# Patient Record
Sex: MALE | Race: WHITE | URBAN - METROPOLITAN AREA
[De-identification: names, ages, dates, MRNs, and addresses within clinical notes are randomized per-mention and may not be internally consistent; named-entity substitution may affect disease eponyms.]

---

## 2017-02-13 ENCOUNTER — OFFICE VISIT (OUTPATIENT)
Dept: DERMATOLOGY | Age: 47
End: 2017-02-13

## 2017-02-13 DIAGNOSIS — T14.8XXA EXCORIATION: ICD-10-CM

## 2017-02-13 DIAGNOSIS — L71.9 ROSACEA: ICD-10-CM

## 2017-02-13 DIAGNOSIS — D22.9 BENIGN NEVUS: ICD-10-CM

## 2017-02-13 DIAGNOSIS — B07.8 OTHER VIRAL WARTS: ICD-10-CM

## 2017-02-13 DIAGNOSIS — L57.0 AK (ACTINIC KERATOSIS): Primary | ICD-10-CM

## 2017-02-13 PROCEDURE — 99214 OFFICE O/P EST MOD 30 MIN: CPT | Performed by: DERMATOLOGY

## 2017-02-13 PROCEDURE — G8421 BMI NOT CALCULATED: HCPCS | Performed by: DERMATOLOGY

## 2017-02-13 PROCEDURE — 17000 DESTRUCT PREMALG LESION: CPT | Performed by: DERMATOLOGY

## 2017-02-13 PROCEDURE — G8427 DOCREV CUR MEDS BY ELIG CLIN: HCPCS | Performed by: DERMATOLOGY

## 2017-02-13 PROCEDURE — 4004F PT TOBACCO SCREEN RCVD TLK: CPT | Performed by: DERMATOLOGY

## 2017-02-13 PROCEDURE — G8484 FLU IMMUNIZE NO ADMIN: HCPCS | Performed by: DERMATOLOGY

## 2018-02-14 ENCOUNTER — OFFICE VISIT (OUTPATIENT)
Dept: DERMATOLOGY | Age: 48
End: 2018-02-14

## 2018-02-14 DIAGNOSIS — L71.9 ROSACEA: ICD-10-CM

## 2018-02-14 DIAGNOSIS — L57.0 AK (ACTINIC KERATOSIS): Primary | ICD-10-CM

## 2018-02-14 DIAGNOSIS — D22.9 BENIGN NEVUS: ICD-10-CM

## 2018-02-14 DIAGNOSIS — L57.8 DIFFUSE PHOTODAMAGE OF SKIN: ICD-10-CM

## 2018-02-14 PROCEDURE — G8421 BMI NOT CALCULATED: HCPCS | Performed by: DERMATOLOGY

## 2018-02-14 PROCEDURE — G8427 DOCREV CUR MEDS BY ELIG CLIN: HCPCS | Performed by: DERMATOLOGY

## 2018-02-14 PROCEDURE — 17000 DESTRUCT PREMALG LESION: CPT | Performed by: DERMATOLOGY

## 2018-02-14 PROCEDURE — 99214 OFFICE O/P EST MOD 30 MIN: CPT | Performed by: DERMATOLOGY

## 2018-02-14 PROCEDURE — G8484 FLU IMMUNIZE NO ADMIN: HCPCS | Performed by: DERMATOLOGY

## 2018-02-14 PROCEDURE — 4004F PT TOBACCO SCREEN RCVD TLK: CPT | Performed by: DERMATOLOGY

## 2018-02-14 NOTE — PROGRESS NOTES
[facility]) Dermatology  Blaine Kyle M.D.  [phone]       Jace Carnes  1970    52 y.o. male     Date of Visit: 2/14/2018    Chief Complaint:   Chief Complaint   Patient presents with    Skin Exam     TBSE        I was asked to see this patient by Dr. Philip ref. provider found. History of Present Illness:  1. Total body skin exam    Multiple nevi. Stable in size, shape, color. Asymptomatic. Patient has not noticed any new or changing pigmented lesions. Progressive erythema and telangiectasias of his nose-father with rhinophyma. No inflammatory papules. Not previously treated. Photodamage-progressive freckling and lentigines of sun exposed areas. Does wear hats and sunscreen. Skin history:  No personal history of dysplastic nevus or skin cancer  + ak     Family history nonmelanoma skin cancer in his father. No family history of melanoma    Review of Systems:  Constitutional: Reports general sense of well-being       Past Medical History, Surgical History, Family History, Medications and Allergies reviewed. Social History:   Social History     Social History    Marital status:      Spouse name: N/A    Number of children: N/A    Years of education: N/A     Occupational History    Not on file. Social History Main Topics    Smoking status: Never Smoker    Smokeless tobacco: Current User      Comment: can a week    Alcohol use Yes    Drug use: Unknown    Sexual activity: Not on file     Other Topics Concern    Not on file     Social History Narrative    No narrative on file       Physical Examination       -General: Well-appearing, NAD  Normal affect. Total body skin exam including scalp, face, neck, chest, abdomen, back, bilateral upper extremities, bilateral lower extremities, ocular conjunctiva, external lips, and nails was performed. Underwear area not examined.   Scattered on the trunk and extremities are multiple well-defined round and oval symmetric smoothly-bordered uniformly brown macules and papules. Background erythema with telangiectasias of his nose. Diffuse back on photo damage with freckling and lentigines. Left temple 4 mm gritty erythematous papule-actinic keratosis      Assessment and Plan     1. AK (actinic keratosis) - Left temple-1 lesion(s) treated w/ liquid nitrogen. Edu re: risk of blister formation, discomfort, scar, hypopigmentation. Discussed wound care. 2. Rosacea -Discussed laser, he can consider in the future if he wishes    3. Benign nevus - Benign acquired melanocytic nevi  -Recommend monthly self skin exams   -Educated regarding the ABCDEs of melanoma detection   -Call for any new/changing moles or concerning lesions  -Reviewed sun protective behavior -- sun avoidance during the peak hours of the day, sun-protective clothing (including hat and sunglasses), sunscreen use (water resistant, broad spectrum, SPF at least 30, need for reapplication every 2 to 3 hours), avoidance of tanning beds      4.  Diffuse photodamage of skin -Hats, sunscreen, sun avoidance

## 2019-05-29 ENCOUNTER — OFFICE VISIT (OUTPATIENT)
Dept: DERMATOLOGY | Age: 49
End: 2019-05-29
Payer: COMMERCIAL

## 2019-05-29 DIAGNOSIS — L57.0 AK (ACTINIC KERATOSIS): Primary | ICD-10-CM

## 2019-05-29 DIAGNOSIS — L71.9 ROSACEA: ICD-10-CM

## 2019-05-29 DIAGNOSIS — D22.9 BENIGN NEVUS: ICD-10-CM

## 2019-05-29 PROCEDURE — G8427 DOCREV CUR MEDS BY ELIG CLIN: HCPCS | Performed by: DERMATOLOGY

## 2019-05-29 PROCEDURE — 17000 DESTRUCT PREMALG LESION: CPT | Performed by: DERMATOLOGY

## 2019-05-29 PROCEDURE — 99213 OFFICE O/P EST LOW 20 MIN: CPT | Performed by: DERMATOLOGY

## 2019-05-29 PROCEDURE — 17003 DESTRUCT PREMALG LES 2-14: CPT | Performed by: DERMATOLOGY

## 2019-05-29 PROCEDURE — 4004F PT TOBACCO SCREEN RCVD TLK: CPT | Performed by: DERMATOLOGY

## 2019-05-29 PROCEDURE — G8421 BMI NOT CALCULATED: HCPCS | Performed by: DERMATOLOGY

## 2019-05-29 NOTE — PROGRESS NOTES
[facility]) Dermatology  Jack Jeronimo M.D.  [phone]       Radha Foley  1970    50 y.o. male     Date of Visit: 5/29/2019    Chief Complaint:   Chief Complaint   Patient presents with    Skin Lesion        I was asked to see this patient by Dr. Philip ref. provider found. History of Present Illness:  1. Total body skin exam    Multiple nevi-stable in size, shape, color. Has not noticed any new or changing pigmented lesions. Does wear hats and sunscreen. Does monitor his skin for change      Skin history:  No personal history of dysplastic nevus or skin cancer  + ak     Family history nonmelanoma skin cancer in his father. No family history of melanoma      Review of Systems:  Constitutional: Reports general sense of well-being       Past Medical History, Surgical History, Family History, Medications and Allergies reviewed. Social History:   Social History     Socioeconomic History    Marital status:      Spouse name: Not on file    Number of children: Not on file    Years of education: Not on file    Highest education level: Not on file   Occupational History    Not on file   Social Needs    Financial resource strain: Not on file    Food insecurity:     Worry: Not on file     Inability: Not on file    Transportation needs:     Medical: Not on file     Non-medical: Not on file   Tobacco Use    Smoking status: Never Smoker    Smokeless tobacco: Current User    Tobacco comment: can a week   Substance and Sexual Activity    Alcohol use:  Yes    Drug use: Not on file    Sexual activity: Not on file   Lifestyle    Physical activity:     Days per week: Not on file     Minutes per session: Not on file    Stress: Not on file   Relationships    Social connections:     Talks on phone: Not on file     Gets together: Not on file     Attends Taoism service: Not on file     Active member of club or organization: Not on file     Attends meetings of clubs or organizations: Not on file Relationship status: Not on file    Intimate partner violence:     Fear of current or ex partner: Not on file     Emotionally abused: Not on file     Physically abused: Not on file     Forced sexual activity: Not on file   Other Topics Concern    Not on file   Social History Narrative    Not on file       Physical Examination       -General: Well-appearing, NAD  1. Normal affect. Total body skin exam including scalp, face, neck, chest, abdomen, back, bilateral upper extremities, bilateral lower extremities, ocular conjunctiva, external lips, and nails was performed. Examination normal unless stated below. Underwear area not examined. Scattered on the trunk and extremities are multiple well-defined round and oval symmetric smoothly-bordered uniformly brown macules and papules. Background erythema of face with no active inflammatory papules. Gritty erythematous papule left anterior shoulder and right lateral forehead at hairline. Assessment and Plan     1. AK (actinic keratosis) - two-face and left shoulder lesion(s) treated w/ liquid nitrogen. Edu re: risk of blister formation, discomfort, scar, hypopigmentation. Discussed wound care. 2. Benign nevus - Benign acquired melanocytic nevi  -Recommend monthly self skin exams   -Educated regarding the ABCDEs of melanoma detection   -Call for any new/changing moles or concerning lesions  -Reviewed sun protective behavior -- sun avoidance during the peak hours of the day, sun-protective clothing (including hat and sunglasses), sunscreen use (water resistant, broad spectrum, SPF at least 30, need for reapplication every 2 to 3 hours), avoidance of tanning beds      3.  Rosacea -no treatment for now

## 2022-05-11 ENCOUNTER — OFFICE VISIT (OUTPATIENT)
Dept: DERMATOLOGY | Age: 52
End: 2022-05-11
Payer: COMMERCIAL

## 2022-05-11 VITALS — TEMPERATURE: 97.2 F

## 2022-05-11 DIAGNOSIS — L71.9 ROSACEA: Primary | ICD-10-CM

## 2022-05-11 DIAGNOSIS — L82.1 SEBORRHEIC KERATOSES: ICD-10-CM

## 2022-05-11 DIAGNOSIS — D22.9 BENIGN NEVUS: ICD-10-CM

## 2022-05-11 PROCEDURE — G8421 BMI NOT CALCULATED: HCPCS | Performed by: DERMATOLOGY

## 2022-05-11 PROCEDURE — 3017F COLORECTAL CA SCREEN DOC REV: CPT | Performed by: DERMATOLOGY

## 2022-05-11 PROCEDURE — 4004F PT TOBACCO SCREEN RCVD TLK: CPT | Performed by: DERMATOLOGY

## 2022-05-11 PROCEDURE — G8427 DOCREV CUR MEDS BY ELIG CLIN: HCPCS | Performed by: DERMATOLOGY

## 2022-05-11 PROCEDURE — 99213 OFFICE O/P EST LOW 20 MIN: CPT | Performed by: DERMATOLOGY

## 2022-05-11 RX ORDER — OMEPRAZOLE 10 MG/1
10 CAPSULE, DELAYED RELEASE ORAL DAILY
COMMUNITY

## 2022-05-11 NOTE — PROGRESS NOTES
[facility]) Dermatology  Kam Delacruz M.D.  [phone]       Jennifer Mathis  1970    46 y.o. male     Date of Visit: 5/11/2022    Chief Complaint:   Chief Complaint   Patient presents with    Skin Lesion     FSE       HX:AK        I was asked to see this patient by Dr. Philip ref. provider found. History of Present Illness:  1. Total-body skin exam    Multiple nevi-stable in size, shape, color. Has not noticed any new or changing pigmented lesions. Increasing number of seborrheic keratoses torso-increasing in size and number. Not itching, bleeding. Asymptomatic    Rosacea-background erythema with telangiectasias nose and cheeks. Does not develop inflammatory papules. Father with a history of severe rosacea/rhinophyma. Skin history:  No personal history of dysplastic nevus or skin cancer  + ak     Family history nonmelanoma skin cancer in his father. No family history of melanoma       Review of Systems:  Constitutional: Reports general sense of well-being       Past Medical History, Surgical History, Family History, Medications and Allergies reviewed. Social History:   Social History     Socioeconomic History    Marital status:      Spouse name: Not on file    Number of children: Not on file    Years of education: Not on file    Highest education level: Not on file   Occupational History    Not on file   Tobacco Use    Smoking status: Never Smoker    Smokeless tobacco: Current User    Tobacco comment: can a week   Vaping Use    Vaping Use: Never used   Substance and Sexual Activity    Alcohol use:  Yes    Drug use: Not on file    Sexual activity: Not on file   Other Topics Concern    Not on file   Social History Narrative    Not on file     Social Determinants of Health     Financial Resource Strain:     Difficulty of Paying Living Expenses: Not on file   Food Insecurity:     Worried About [address] in the Last Year: Not on file    [address] in the Last Year: Not on file   Transportation Needs:     Lack of Transportation (Medical): Not on file    Lack of Transportation (Non-Medical): Not on file   Physical Activity:     Days of Exercise per Week: Not on file    Minutes of Exercise per Session: Not on file   Stress:     Feeling of Stress : Not on file   Social Connections:     Frequency of Communication with Friends and Family: Not on file    Frequency of Social Gatherings with Friends and Family: Not on file    Attends Jewish Services: Not on file    Active Member of [address] [place] [address] [employer] or Organizations: Not on file    Attends Club or Organization Meetings: Not on file    Marital Status: Not on file   Intimate Partner Violence:     Fear of Current or Ex-Partner: Not on file    Emotionally Abused: Not on file    Physically Abused: Not on file    Sexually Abused: Not on file   Housing Stability:     Unable to Pay for Housing in the Last Year: Not on file    Number of [place] in the Last Year: Not on file    Unstable Housing in the Last Year: Not on file       Physical Examination       -General: Well-appearing, NAD  1. Normal affect. Total body skin exam including scalp, face, neck, chest, abdomen, back, bilateral upper extremities, bilateral lower extremities, ocular conjunctiva, external lips, and nails was performed. Examination normal unless stated below. Underwear area not examined. Scattered on the trunk and extremities are multiple well-defined round and oval symmetric smoothly-bordered uniformly brown macules and papules. Background erythema with telangiectasias nose and cheeks. No active inflammatory papules. Scattered hyperkeratotic stuck on papules torso      Assessment and Plan     1. Rosacea-no treatment for now   2.  Benign nevus - Benign acquired melanocytic nevi  -Recommend monthly self skin exams   -Educated regarding the ABCDEs of melanoma detection   -Call for any new/changing moles or concerning lesions  -Reviewed sun protective behavior -- sun avoidance during the peak hours of the day, sun-protective clothing (including hat and sunglasses), sunscreen use (water resistant, broad spectrum, SPF at least 30, need for reapplication every 2 to 3 hours), avoidance of tanning beds      3. Seborrheic keratoses - Discussed underlying nature of seborrheic keratosis and low risk of malignancy. Treatment reserved for lesions that are itching, bleeding, growing or otherwise becoming bothersome. Discussed monitoring for change with reevaluation for changing lesions.

## 2022-11-09 NOTE — H&P
[address] [facility] [address], [address], [address]  Phone: [phone] [facility] ,  [address] [facility]  Phone: [phone]   ZVO:[phone]    CHIEF COMPLAINT     Colon cancer screening       HPI     Thank you Gelacio Tapia MD for asking me to see Divine Leon in consultation. Divine Leon is a 46 y.o. male  [2] White (non-) [1] with medical history of anal fissure status post repair seen independently with referral for colon cancer screening. Denies abdominal pain, nausea, vomiting, fever, chills, unintentional weight loss, constipation, diarrhea, hematochezia or melenic stools. No family history of colon cancer or large colon polyps. Last EGD: None  Last Colonoscopy: None    Review of available records reveals: Wt Readings from Last 50 Encounters:   09/04/12 212 lb 8 oz (96.4 kg)       No components found for: HGBA1C  BP Readings from Last 3 Encounters:   09/04/12 108/72     Health Maintenance   Topic Date Due    COVID-19 Vaccine (1) Never done    Depression Screen  Never done    HIV screen  Never done    Hepatitis C screen  Never done    Lipids  Never done    Colorectal Cancer Screen  Never done    DTaP/Tdap/Td vaccine (2 - Td or Tdap) 08/04/2020    Shingles vaccine (1 of 2) Never done    Flu vaccine (1) Never done    Hepatitis A vaccine  Aged Out    Hib vaccine  Aged Out    Meningococcal (ACWY) vaccine  Aged Out    Pneumococcal 0-64 years Vaccine  Aged Out       No components found for: [facility]   No past medical history on file.   FAMILY HISTORY     Family History   Problem Relation Age of Onset    Arrhythmia Father     Cancer Father         bcc/scc     SOCIAL HISTORY     Social History     Socioeconomic History    Marital status:      Spouse name: Not on file    Number of children: Not on file    Years of education: Not on file    Highest education level: Not on file   Occupational History Not on file   Tobacco Use    Smoking status: Never    Smokeless tobacco: Current    Tobacco comments:     can a week   Vaping Use    Vaping Use: Never used   Substance and Sexual Activity    Alcohol use: Yes    Drug use: Not on file    Sexual activity: Not on file   Other Topics Concern    Not on file   Social History Narrative    Not on file     Social Determinants of Health     Financial Resource Strain: Not on file   Food Insecurity: Not on file   Transportation Needs: Not on file   Physical Activity: Not on file   Stress: Not on file   Social Connections: Not on file   Intimate Partner Violence: Not on file   Housing Stability: Not on file     SURGICAL HISTORY     Past Surgical History:   Procedure Laterality Date    KNEE ARTHROSCOPY      Rt knee     CURRENT MEDICATIONS   (This list may include medications prescribed during this encounter as epic can not insert only the list prior to this encounter.)  Current Outpatient Rx   Medication Sig Dispense Refill    Loratadine-Pseudoephedrine (CLARITIN-D 24 HOUR PO) Take by mouth      omeprazole (PRILOSEC) 10 MG delayed release capsule Take 10 mg by mouth daily      econazole nitrate 1 % cream Apply BID 85 g 6    econazole nitrate 1 % cream Apply topically daily. 85 g 11     ALLERGIES   No Known Allergies  IMMUNIZATIONS     There is no immunization history on file for this patient. REVIEW OF SYSTEMS   See HPI for further details and pertinent postiives. Negative for the following:  Constitutional: Negative for weight change. Negative for appetite change and fatigue. HENT: Negative for nosebleeds, sore throat, mouth sores, and voice change. Respiratory: Negative for cough, choking and chest tightness. Cardiovascular: Negative for chest pain   Gastrointestinal: See HPI  Musculoskeletal: Negative for arthralgias. Skin: Negative for pallor. Neurological: Negative for weakness and light-headedness. Hematological: Negative for adenopathy.  Does not bruise/bleed easily. Psychiatric/Behavioral: Negative for suicidal ideas. PHYSICAL EXAM   VITAL SIGNS: There were no vitals taken for this visit. Wt Readings from Last 3 Encounters:   09/04/12 212 lb 8 oz (96.4 kg)     Constitutional: Well developed, Well nourished, No acute distress, Non-toxic appearance. HENT: Normocephalic, Atraumatic, Bilateral external ears normal, Oropharynx moist, No oral exudates, Nose normal.   Eyes: Conjunctiva normal, No discharge. Neck: Normal range of motion, No tenderness,  Cardiovascular: Normal heart rate, Normal rhythm, No murmurs, No rubs, No gallops. Thorax & Lungs: Normal breath sounds, No respiratory distress, No wheezing,   Abdomen:, normal bowel sounds, soft, non tender, non distended, scars consistent with stated surgeries, no hernias   Rectal:  Deferred. Skin: Warm, Dry, No erythema, No rash  Lower Extremities: Intact distal pulses, No edema, No tenderness, No cyanosis, No clubbing. Neurologic: Alert & oriented x 3  RADIOLOGY/PROCEDURES   No results found. FINAL IMPRESSION   Colon cancer screening    Plan: colonoscopy    Colonoscopy with alternatives, rationale, benefits and risks, not limited to bleeding, infection, perforation, need of surgery, prolong hospital stay and anesthesia side effects were explained. Patient verbalized understanding and agrees to proceed with colonoscopy. ORDERED FUTURE/PENDING TESTS       FOLLOWUP   No follow-ups on file.           Sunny Jo MD 11/9/22 3:27 PM EST    CC:  Boris Pimentel MD

## 2022-11-11 ENCOUNTER — ANESTHESIA (OUTPATIENT)
Dept: ENDOSCOPY | Age: 52
End: 2022-11-11
Payer: COMMERCIAL

## 2022-11-11 ENCOUNTER — ANESTHESIA EVENT (OUTPATIENT)
Dept: ENDOSCOPY | Age: 52
End: 2022-11-11
Payer: COMMERCIAL

## 2022-11-11 ENCOUNTER — HOSPITAL ENCOUNTER (OUTPATIENT)
Age: 52
Setting detail: OUTPATIENT SURGERY
Discharge: HOME | End: 2022-11-11
Attending: INTERNAL MEDICINE | Admitting: INTERNAL MEDICINE
Payer: COMMERCIAL

## 2022-11-11 VITALS
HEART RATE: 60 BPM | SYSTOLIC BLOOD PRESSURE: 122 MMHG | BODY MASS INDEX: 28 KG/M2 | DIASTOLIC BLOOD PRESSURE: 82 MMHG | OXYGEN SATURATION: 95 % | TEMPERATURE: 97.7 F | RESPIRATION RATE: 15 BRPM | HEIGHT: 71 IN | WEIGHT: 200 LBS

## 2022-11-11 DIAGNOSIS — Z12.11 SCREENING FOR COLON CANCER: ICD-10-CM

## 2022-11-11 PROCEDURE — 3700000001 HC ADD 15 MINUTES (ANESTHESIA): Performed by: INTERNAL MEDICINE

## 2022-11-11 PROCEDURE — 2709999900 HC NON-CHARGEABLE SUPPLY: Performed by: INTERNAL MEDICINE

## 2022-11-11 PROCEDURE — 7100000011 HC PHASE II RECOVERY - ADDTL 15 MIN: Performed by: INTERNAL MEDICINE

## 2022-11-11 PROCEDURE — 2580000003 HC RX 258: Performed by: INTERNAL MEDICINE

## 2022-11-11 PROCEDURE — 88305 TISSUE EXAM BY PATHOLOGIST: CPT

## 2022-11-11 PROCEDURE — 3700000000 HC ANESTHESIA ATTENDED CARE: Performed by: INTERNAL MEDICINE

## 2022-11-11 PROCEDURE — 6360000002 HC RX W HCPCS: Performed by: NURSE ANESTHETIST, CERTIFIED REGISTERED

## 2022-11-11 PROCEDURE — 2500000003 HC RX 250 WO HCPCS: Performed by: NURSE ANESTHETIST, CERTIFIED REGISTERED

## 2022-11-11 PROCEDURE — 7100000010 HC PHASE II RECOVERY - FIRST 15 MIN: Performed by: INTERNAL MEDICINE

## 2022-11-11 PROCEDURE — 3609010300 HC COLONOSCOPY W/BIOPSY SINGLE/MULTIPLE: Performed by: INTERNAL MEDICINE

## 2022-11-11 RX ORDER — PROPOFOL 10 MG/ML
INJECTION, EMULSION INTRAVENOUS PRN
Status: DISCONTINUED | OUTPATIENT
Start: 2022-11-11 | End: 2022-11-11 | Stop reason: SDUPTHER

## 2022-11-11 RX ORDER — LIDOCAINE HYDROCHLORIDE 20 MG/ML
INJECTION, SOLUTION INFILTRATION; PERINEURAL PRN
Status: DISCONTINUED | OUTPATIENT
Start: 2022-11-11 | End: 2022-11-11 | Stop reason: SDUPTHER

## 2022-11-11 RX ORDER — LIDOCAINE HYDROCHLORIDE 10 MG/ML
0.1 INJECTION, SOLUTION EPIDURAL; INFILTRATION; INTRACAUDAL; PERINEURAL
Status: DISCONTINUED | OUTPATIENT
Start: 2022-11-11 | End: 2022-11-11 | Stop reason: HOSPADM

## 2022-11-11 RX ORDER — SODIUM CHLORIDE, SODIUM LACTATE, POTASSIUM CHLORIDE, CALCIUM CHLORIDE 600; 310; 30; 20 MG/100ML; MG/100ML; MG/100ML; MG/100ML
INJECTION, SOLUTION INTRAVENOUS ONCE
Status: COMPLETED | OUTPATIENT
Start: 2022-11-11 | End: 2022-11-11

## 2022-11-11 RX ADMIN — PROPOFOL 20 MG: 10 INJECTION, EMULSION INTRAVENOUS at 10:48

## 2022-11-11 RX ADMIN — LIDOCAINE HYDROCHLORIDE 80 MG: 20 INJECTION, SOLUTION INFILTRATION; PERINEURAL at 10:36

## 2022-11-11 RX ADMIN — PROPOFOL 100 MG: 10 INJECTION, EMULSION INTRAVENOUS at 10:36

## 2022-11-11 RX ADMIN — PROPOFOL 50 MG: 10 INJECTION, EMULSION INTRAVENOUS at 10:45

## 2022-11-11 RX ADMIN — SODIUM CHLORIDE, SODIUM LACTATE, POTASSIUM CHLORIDE, AND CALCIUM CHLORIDE: .6; .31; .03; .02 INJECTION, SOLUTION INTRAVENOUS at 10:34

## 2022-11-11 RX ADMIN — PROPOFOL 50 MG: 10 INJECTION, EMULSION INTRAVENOUS at 10:39

## 2022-11-11 RX ADMIN — PROPOFOL 50 MG: 10 INJECTION, EMULSION INTRAVENOUS at 10:42

## 2022-11-11 ASSESSMENT — PAIN SCALES - GENERAL: PAINLEVEL_OUTOF10: 0

## 2022-11-11 NOTE — OP NOTE
[address] [facility] [address],  [address], [address]  Phone: [phone]   PZO:[phone]   [address]  [facility]  Phone: [phone]   RPN:[phone]      Colonoscopy Procedure Note    Patient: Merritt Rivero  : 1970    Procedure: Colonoscopy with polypectomy (cold biopsy)    Date:  2022     Endoscopist:  Sean Landrum MD    Referring Physician:  Tito Dukes MD    Preoperative Diagnosis:  Screening for colon cancer [Z12.11]    Postoperative Diagnosis: Transverse colon polyps, diverticulosis, internal hemorrhoids    Anesthesia: Anesthesia: MAC  Sedation:  Propofol per anesthesia  Start Time: 95  Stop Time: 1049  ASA Class: 2  Mallampati: II (soft palate, uvula, fauces visible)    Indications: This is a 46y.o. year old male with medical history of anal fissure status post repair seen independently with referral for colon cancer screening. Procedure Details  Informed consent was obtained for the procedure, including sedation. Risks of perforation, hemorrhage, adverse drug reaction and aspiration were discussed. The patient was placed in the left lateral decubitus position. Based on the pre-procedure assessment, including review of the patient's medical history, medications, allergies, and review of systems, he had been deemed to be an appropriate candidate for above IV sedation; he was therefore sedated and monitored continuously with ECG tracing, pulse oximetry, blood pressure monitoring, and direct observations. Rectal examination was performed. There were no external hemorrhoids, fissures or skin tags. The colonoscope was inserted into the rectum and advanced under direct vision to the terminal ileum. The right colon was examined twice as this increases polyp detection especially if other right colon polyps, older age, male, or gonzalez syndrome. The quality of the colonic preparation was excellent.   A careful inspection was made as the colonoscope was withdrawn, including a retroflexed view of the rectum; findings and interventions are described below. Appropriate photodocumentation Was Obtained: terminal ileum, appendiceal orifice and ileocecal valve. Findings:   Small sized non bleeding diverticula scattered throughout colon. Two  2-4 mm sessile polyps in the transverse, removed by cold forceps and retrieved for pathology. No bleeding noted. Normal appearing terminal ileum. Medium sized non bleeding internal hemorrhoids.       - PREP: Suprep  - Overall difficulty: minimal in degree  - Abdominal pressure: no  - Change in position: no  - Anesthesia issues: no    Specimens: Was Obtained:     Complications:   None; patient tolerated the procedure well. Disposition:   PACU - hemodynamically stable. Estimated Blood loss:  Minimal to none. Withdrawal Time:  6 minutes    Impression:   Small sized non bleeding diverticula scattered throughout colon. Two  2-4 mm sessile polyps in the transverse, removed by cold forceps and retrieved. Normal appearing terminal ileum. Medium sized non bleeding internal hemorrhoids. Recommendations:  -Await pathology. ,   -If adenoma is present, repeat colonoscopy in 7 years. ,   -High fiber diet. ,   -Follow up with primary care physician. Leno Colorado MD   [id number]   11/11/22       Please note that some or all of this record was generated using voice recognition software. If there are any questions about the content of this document, please contact the author as some errors in translation may have occurred.

## 2022-11-11 NOTE — DISCHARGE INSTRUCTIONS
PATIENT INSTRUCTIONS  POST-SEDATION    Moon Gruber          IMMEDIATELY FOLLOWING PROCEDURE:    Do not drive or operate machinery for the first twenty four hours after surgery. Do not make any important decisions for twenty four hours after surgery or while taking narcotic pain medications or sedatives. You should NOT BE LEFT UNATTENDED OR ALONE. A responsible adult should be with you for the rest of the day of your procedure and also during the night for your protection and safety. You may experience some light headedness. Rest at home with activity as tolerated. You may not need to go to bed, but it is important to rest for the next 24 hours. You should not engage in athletic sports such as basketball, volleyball, jogging, skating, or activities requiring refined motor skills for 24 hours. If you develop intractable nausea and vomiting or a severe headache please notify your doctor immediately. You are not expected to have any fever, but if you feel warm, take your temperature. If you have a fever 101 degrees or higher, call your doctor. If you have had an Endoscopy:   *Eat lightly for your first meal and gradually resume your normal / prescribed diet. *If you have had a colonoscopy, do not expect a normal bowel movement for approximately three days due to the cleansing of the large intestine prior to colonoscopy. ONCE YOU ARE HOME, IF YOU SHOULD HAVE:  Difficulty in breathing, persistent nausea or vomiting, bleeding you feel is excessive, or pain that is unusual, increased abdominal bloating, or any swelling, fever / chills, call your physician. If you cannot contact your physician, but feel that your signs and symptoms need a physician's attention, go to the Emergency Department. FOLLOW-UP:    Please follow up with [email or web address] as scheduled or needed. Dr. Ishmael Ramirez MD will call you with the biopsy findings. Call Dr. Ishmael Ramirez MD if there are any GI concerns. [phone]    Repeat Colonoscopy in 7 years based on pathology. You may be receiving a follow up phone call to ask about your care. Colonoscopy: What to Expect at [address] [place] [address]  After you have a colonoscopy, you will stay at the clinic for 1 to 2 hours until the medicines wear off. Then you can go home. But you will need to arrange for a ride. Your doctor will tell you when you can eat and do your other usual activities. Your doctor will talk to you about when you will need your next colonoscopy. Your doctor can help you decide how often you need to be checked. This will depend on the results of your test and your risk for colorectal cancer. After the test, you may be bloated or have gas pains. You may need to pass gas. If a biopsy was done or a polyp was removed, you may have streaks of blood in your stool (feces) for a few days. Problems such as heavy rectal bleeding may not occur until several weeks after the test. This isn't common. But it can happen after polyps are removed. This care sheet gives you a general idea about how long it will take for you to recover. But each person recovers at a different pace. Follow the steps below to get better as quickly as possible. How can you care for yourself at home? Activity    Rest when you feel tired. You can do your normal activities when it feels okay to do so. Diet    Follow your doctor's directions for eating. Unless your doctor has told you not to, drink plenty of fluids. This helps to replace the fluids that were lost during the colon prep. Do not drink alcohol. Medicines    Your doctor will tell you if and when you can restart your medicines. He or she will also give you instructions about taking any new medicines. If you take blood thinners, such as warfarin (Coumadin), clopidogrel (Plavix), or aspirin, be sure to talk to your doctor. He or she will tell you if and when to start taking those medicines again.  Make sure that you understand exactly what your doctor wants you to do. If polyps were removed or a biopsy was done during the test, your doctor may tell you not to take aspirin or other anti-inflammatory medicines for a few days. These include ibuprofen (Advil, Motrin) and naproxen (Aleve). Other instructions    For your safety, do not drive or operate machinery until the medicine wears off and you can think clearly. Your doctor may tell you not to drive or operate machinery until the day after your test.     Do not sign legal documents or make major decisions until the medicine wears off and you can think clearly. The anesthesia can make it hard for you to fully understand what you are agreeing to. Follow-up care is a key part of your treatment and safety. Be sure to make and go to all appointments, and call your doctor if you are having problems. It's also a good idea to know your test results and keep a list of the medicines you take. When should you call for help? Call 911 anytime you think you may need emergency care. For example, call if:    You passed out (lost consciousness). You pass maroon or bloody stools. You have trouble breathing. Call your doctor now or seek immediate medical care if:    You have pain that does not get better after you take pain medicine. You are sick to your stomach or cannot drink fluids. You have new or worse belly pain. You have blood in your stools. You have a fever. You cannot pass stools or gas. Watch closely for changes in your health, and be sure to contact your doctor if you have any problems. Where can you learn more? Go to https://[employer]. org and sign in to your Crunchbutton account. Enter E264 in the NearDesk box to learn more about \"Colonoscopy: What to Expect at Home. \"     If you do not have an account, please click on the \"Sign Up Now\" link. Current as of:  May 12, 2017  Content Version: 11.6  © 6514-0748 Healthwise, Incorporated. Care instructions adapted under license by [facility] ([facility]). If you have questions about a medical condition or this instruction, always ask your healthcare professional. [address] any warranty or liability for your use of this information.

## 2022-11-11 NOTE — ANESTHESIA PRE PROCEDURE
Department of Anesthesiology  Preprocedure Note       Name:  Alexis Reyes   Age:  46 y.o.  :  1970                                          MRN:  [id number]         Date:  2022      Surgeon: Edward Giron):  Maya Graham MD    Procedure: Procedure(s):  COLONOSCOPY    Medications prior to admission:   Prior to Admission medications    Medication Sig Start Date End Date Taking? Authorizing Provider   Loratadine-Pseudoephedrine (CLARITIN-D 24 HOUR PO) Take by mouth    Historical Provider, MD   omeprazole (PRILOSEC) 10 MG delayed release capsule Take 10 mg by mouth daily    Historical Provider, MD   econazole nitrate 1 % cream Apply BID 22   Jose Armando Echevarria MD   econazole nitrate 1 % cream Apply topically daily. 2/10/16   Jose Armando Echevarria MD       Current medications:    Current Facility-Administered Medications   Medication Dose Route Frequency Provider Last Rate Last Admin    lactated ringers infusion   IntraVENous Once Maya Graham MD        lidocaine PF 1 % injection 0.1 mL  0.1 mL IntraDERmal Once PRN Maya Graham MD           Allergies:  No Known Allergies    Problem List:    Patient Active Problem List   Diagnosis Code    [facility] I48.92       Past Medical History:  History reviewed. No pertinent past medical history. Past Surgical History:        Procedure Laterality Date    KNEE ARTHROSCOPY      Rt knee       Social History:    Social History     Tobacco Use    Smoking status: Never    Smokeless tobacco: Current    Tobacco comments:     can a week   Substance Use Topics    Alcohol use:  Yes                                Ready to quit: Not Answered  Counseling given: Not Answered  Tobacco comments: can a week      Vital Signs (Current):   Vitals:    22 0939   BP: 138/77   Pulse: 64   Resp: 16   Temp: 97.7 °F (36.5 °C)   SpO2: 95%   Weight: 200 lb (90.7 kg)   Height: 5' 11\" (1.803 m)                                              BP Readings from Last 3 Encounters:   11/11/22 138/77   09/04/12 108/72       NPO Status:                                                                                 BMI:   Wt Readings from Last 3 Encounters:   11/11/22 200 lb (90.7 kg)   09/04/12 212 lb 8 oz (96.4 kg)     Body mass index is 27.89 kg/m². CBC: No results found for: WBC, RBC, HGB, HCT, MCV, RDW, PLT    CMP: No results found for: NA, K, CL, CO2, BUN, CREATININE, GFRAA, AGRATIO, LABGLOM, GLUCOSE, GLU, PROT, CALCIUM, BILITOT, ALKPHOS, AST, ALT    POC Tests: No results for input(s): POCGLU, POCNA, POCK, POCCL, POCBUN, POCHEMO, POCHCT in the last 72 hours. Coags: No results found for: PROTIME, INR, APTT    HCG (If Applicable): No results found for: PREGTESTUR, PREGSERUM, HCG, HCGQUANT     ABGs: No results found for: PHART, PO2ART, LVC4AZO, DWB9ZCG, BEART, A1RLMBBA     Type & Screen (If Applicable):  No results found for: LABABO, LABRH    Drug/Infectious Status (If Applicable):  No results found for: HIV, HEPCAB    COVID-19 Screening (If Applicable): No results found for: COVID19        Anesthesia Evaluation  Patient summary reviewed and Nursing notes reviewed  Airway: Mallampati: II  TM distance: >3 FB   Neck ROM: full  Mouth opening: > = 3 FB   Dental: normal exam         Pulmonary:Negative Pulmonary ROS and normal exam                               Cardiovascular:    (+) dysrhythmias: atrial flutter,                   Neuro/Psych:   Negative Neuro/Psych ROS              GI/Hepatic/Renal: Neg GI/Hepatic/Renal ROS            Endo/Other: Negative Endo/Other ROS                    Abdominal:             Vascular: negative vascular ROS. Other Findings:           Anesthesia Plan      MAC     ASA 2       Induction: intravenous. Anesthetic plan and risks discussed with patient. Plan discussed with CRNA.     Attending anesthesiologist reviewed and agrees with Preprocedure content                ZEYAD Salcedo MD   11/11/2022

## 2023-02-17 ENCOUNTER — PROCEDURE VISIT (OUTPATIENT)
Dept: CARDIOLOGY CLINIC | Age: 53
End: 2023-02-17

## 2023-02-17 DIAGNOSIS — I34.1 MITRAL VALVE PROLAPSE: Primary | ICD-10-CM

## 2023-02-17 LAB
LV EF: 53 %
LVEF MODALITY: NORMAL

## 2023-04-14 PROBLEM — I34.0 NONRHEUMATIC MITRAL VALVE REGURGITATION: Status: ACTIVE | Noted: 2023-04-14

## 2023-10-31 ENCOUNTER — TELEPHONE (OUTPATIENT)
Dept: CARDIOLOGY CLINIC | Age: 53
End: 2023-10-31

## 2023-10-31 DIAGNOSIS — I34.0 NONRHEUMATIC MITRAL VALVE REGURGITATION: Primary | ICD-10-CM

## 2024-01-09 ENCOUNTER — OFFICE VISIT (OUTPATIENT)
Dept: DERMATOLOGY | Age: 54
End: 2024-01-09
Payer: COMMERCIAL

## 2024-01-09 DIAGNOSIS — D22.9 BENIGN NEVUS: ICD-10-CM

## 2024-01-09 DIAGNOSIS — L71.9 ROSACEA: Primary | ICD-10-CM

## 2024-01-09 DIAGNOSIS — L82.1 SEBORRHEIC KERATOSES: ICD-10-CM

## 2024-01-09 DIAGNOSIS — B35.3 TINEA PEDIS OF BOTH FEET: ICD-10-CM

## 2024-01-09 PROCEDURE — G8419 CALC BMI OUT NRM PARAM NOF/U: HCPCS | Performed by: DERMATOLOGY

## 2024-01-09 PROCEDURE — 99214 OFFICE O/P EST MOD 30 MIN: CPT | Performed by: DERMATOLOGY

## 2024-01-09 PROCEDURE — 4004F PT TOBACCO SCREEN RCVD TLK: CPT | Performed by: DERMATOLOGY

## 2024-01-09 PROCEDURE — G8484 FLU IMMUNIZE NO ADMIN: HCPCS | Performed by: DERMATOLOGY

## 2024-01-09 PROCEDURE — 3017F COLORECTAL CA SCREEN DOC REV: CPT | Performed by: DERMATOLOGY

## 2024-01-09 PROCEDURE — G8427 DOCREV CUR MEDS BY ELIG CLIN: HCPCS | Performed by: DERMATOLOGY

## 2024-01-26 ENCOUNTER — TELEPHONE (OUTPATIENT)
Dept: CARDIOLOGY CLINIC | Age: 54
End: 2024-01-26

## 2024-01-26 ENCOUNTER — HOSPITAL ENCOUNTER (OUTPATIENT)
Dept: NON INVASIVE DIAGNOSTICS | Age: 54
Discharge: HOME | End: 2024-01-26
Payer: COMMERCIAL

## 2024-01-26 DIAGNOSIS — I34.0 NONRHEUMATIC MITRAL VALVE REGURGITATION: ICD-10-CM

## 2024-01-26 PROCEDURE — 93306 TTE W/DOPPLER COMPLETE: CPT

## 2024-01-26 NOTE — TELEPHONE ENCOUNTER
Per MMK - He was to have the ECHO with the appointment. Read as unchanged. F/u April to discuss further.     I called and spoke to Deshawn.  Above message provided and confirmed upcoming appointment on 4/5/24 at 9:30A at the [facility] Office.  He verbalized understanding and denied further questions and/or concerns.

## 2024-03-01 ENCOUNTER — TELEPHONE (OUTPATIENT)
Dept: DERMATOLOGY | Age: 54
End: 2024-03-01

## 2024-03-01 NOTE — TELEPHONE ENCOUNTER
Pt  of Dr. Joyce who was referred to Dr. Alvares for Laser Please call him to schedule [phone]

## 2024-03-01 NOTE — TELEPHONE ENCOUNTER
Returned call to patient @ [phone]  [facility] to see what kind of appt was needed

## 2024-03-18 NOTE — TELEPHONE ENCOUNTER
Patient scheduled for vbeam-primarily nose area for rosacea.     Patient will be going to [facility] the end of June-first of July. I advised patient that he would need to wear sunscreen and protect the area after the treatment. Patient made me aware that he may go to the tanning bed before his trip.

## 2024-04-05 ENCOUNTER — OFFICE VISIT (OUTPATIENT)
Dept: CARDIOLOGY CLINIC | Age: 54
End: 2024-04-05
Payer: COMMERCIAL

## 2024-04-05 VITALS
OXYGEN SATURATION: 99 % | DIASTOLIC BLOOD PRESSURE: 78 MMHG | HEIGHT: 71 IN | SYSTOLIC BLOOD PRESSURE: 126 MMHG | HEART RATE: 50 BPM | WEIGHT: 208 LBS | BODY MASS INDEX: 29.12 KG/M2

## 2024-04-05 DIAGNOSIS — K21.9 GASTROESOPHAGEAL REFLUX DISEASE WITHOUT ESOPHAGITIS: ICD-10-CM

## 2024-04-05 DIAGNOSIS — Z91.89 AT RISK FOR CORONARY ARTERY DISEASE: ICD-10-CM

## 2024-04-05 DIAGNOSIS — I34.0 NONRHEUMATIC MITRAL VALVE REGURGITATION: Primary | ICD-10-CM

## 2024-04-05 PROCEDURE — 3017F COLORECTAL CA SCREEN DOC REV: CPT | Performed by: INTERNAL MEDICINE

## 2024-04-05 PROCEDURE — 99214 OFFICE O/P EST MOD 30 MIN: CPT | Performed by: INTERNAL MEDICINE

## 2024-04-05 PROCEDURE — G8419 CALC BMI OUT NRM PARAM NOF/U: HCPCS | Performed by: INTERNAL MEDICINE

## 2024-04-05 PROCEDURE — G8427 DOCREV CUR MEDS BY ELIG CLIN: HCPCS | Performed by: INTERNAL MEDICINE

## 2024-04-05 PROCEDURE — 4004F PT TOBACCO SCREEN RCVD TLK: CPT | Performed by: INTERNAL MEDICINE

## 2024-04-05 NOTE — PROGRESS NOTES
[facility]  Cardiac Consult     Referring Provider:  Royce Madrigal MD     Chief Complaint   Patient presents with    1 Year Follow Up     F/u echo 1/26/24        History of Present Illness:  53 y.o. male seen in consultation at the request of Dr. Madrigal for mitral regurgitation.    He is very active. He exercises, including jogging and weight lifting on a regular basis. He just returned from another [place] from a ski trip.  He was seen by Dr. Wheeler in 2005 with palpitations. Holter at that time was normal except for some PVC's. Stress ECHO normal with mild-moderate MR.    Due to the history of MR his ECHO was  repeated 2/2023. EF read as 50-55% and MR read as moderate. On review it appeared mild-mod to me. Repeat with ov ordered for 1 year. ECHO was done 1/2024 and ov today. Again read as moderate. On review again appears more mild-mod to me.    Remains very active. No chest pain or dyspnea. Very active out doors. Just returned from ski trip at [place]. His son is a student at [employer].     Past Medical History:  Mitral regurgitation, GERD, hypothyroid    Surgical History:   has a past surgical history that includes Knee arthroscopy and Colonoscopy (N/A, 11/11/2022).     Social History:  Social History     Tobacco Use    Smoking status: Never    Smokeless tobacco: Current    Tobacco comments:     can a week   Substance Use Topics    Alcohol use: Yes        Family History:  family history includes Arrhythmia in his father; Cancer in his father.     Allergies:  Patient has no known allergies.     Home Medications:  Prior to Visit Medications    Medication Sig Taking? Authorizing Provider   econazole nitrate 1 % cream APPLY TOPICALLY TO THE AFFECTED AREA TWICE DAILY Yes Pastora Joyce MD   ARMOUR THYROID 120 MG tablet 1.5 tablets daily Yes ProviderSom MD   Loratadine-Pseudoephedrine (CLARITIN-D 24 HOUR PO) Take by mouth Yes ProviderSom MD   omeprazole (PRILOSEC) 10

## 2024-04-05 NOTE — PATIENT INSTRUCTIONS
Continue current meds  Follow up in 1 year with an echo the same day at [facility]     Call Laura (Dr Malone's nurse) if having trouble scheduling appt and echo same day

## 2024-06-04 ENCOUNTER — PROCEDURE VISIT (OUTPATIENT)
Dept: DERMATOLOGY | Age: 54
End: 2024-06-04

## 2024-06-04 DIAGNOSIS — L71.9 ROSACEA: ICD-10-CM

## 2024-06-04 DIAGNOSIS — I78.1 TELANGIECTASIA: Primary | ICD-10-CM

## 2024-06-04 NOTE — PROGRESS NOTES
Laser Procedure Note       Deshawn Magaña   YOB: 1970    DATE OF VISIT:  2024  Chief Complaint   Patient presents with    Procedure     LASER: Vbeam  DIAGNOSIS: nose - rosacea    Here for trx of nose - concerns are erythema, telangiectasias and sebaceous overgrowth.  He is concerned that he will end up with rhinophyma and would like to proceed with any trx to improve appearance or prevent this and erythema.    Discussed and rec laser - Vbeam.  No other beneficial trx for rhinophyma prevention but does not have significant sebaceous overgrowth at this point.    He is an endodontist.              We discussed treatment options, including no treatment as well as the following:  - need for multiple treatments and risk of incomplete clearance, recurrence  - risk of erythema, edema, purpura, dyspigmentation and scarring    PATIENT IDENTIFIED PER PROTOCOL: yes  LOCATION(S): face  VERIFIED AND MARKED: yes  TECHNIQUES, RISKS, BENEFITS AND ALTERNATIVES EXPLAINED: yes  CONSENT SIGNED, WITNESSED AND DATED: yes        OPERATIVE REPORT    DIAGNOSIS, LOCATION, PROCEDURE RECONFIRMED: yes   APPROPRIATE EYE PROTECTION for PATIENT: yes  APPROPRIATE EYE PROTECTION for PROVIDER and OTHERS PRESENT: yes  ANESTHESIA/PRE-OP MEDICATIONS: none  LASER SETTINGS:  (1)  WAVELENGTH: 595  LENS: 3x10 FLUENCE: 12.5  PULSE DURATION: 10  COOLIN/20  (2)  WAVELENGTH: 595  LENS: 10  FLUENCE: 7.5  PULSE DURATION: 10  COOLIN/20    PROCEDURE NOTE:  Individual telangiectasias treated with 2-3 pulses with setting 1 with blanching.  Then entire nose + medial/perinasal cheeks treated with setting 2.    POST-OPERATIVE CARE/DISPOSITION: ice packs  COMPLICATIONS: none  MEDICATIONS: none  WOUND CARE INSTRUCTIONS PROVIDED: yes    F/u 4-6 weeks      150 - nose    Also advised against tanning bed use prior to upcoming vacation - ed DNA damage and incr risk skin cancer.

## 2024-06-10 ENCOUNTER — TELEPHONE (OUTPATIENT)
Dept: DERMATOLOGY | Age: 54
End: 2024-06-10

## 2024-07-23 ENCOUNTER — PROCEDURE VISIT (OUTPATIENT)
Dept: DERMATOLOGY | Age: 54
End: 2024-07-23

## 2024-07-23 DIAGNOSIS — I78.1 TELANGIECTASIA: Primary | ICD-10-CM

## 2024-07-23 DIAGNOSIS — L71.9 ROSACEA: ICD-10-CM

## 2024-07-23 PROCEDURE — DM01505 PIGMENTED LASER 2-5 LESIONS: Performed by: DERMATOLOGY

## 2024-07-23 NOTE — PATIENT INSTRUCTIONS
Following the procedure, the treated areas may be red or appear bruised and will likely be swollen for a few hours or up to a few days.  The affected areas should be treated delicately following treatment.  Discomfort and swelling should be treated with the application of hourly cool compresses the evening of the procedure.  Avoid applying ice directly to the skin.  If your face was treated, you may want to sleep with your head elevated on an extra pillow to help minimize swelling.    Wear sunscreen daily and avoid strenuous activity following rosacea treatments to optimize results.    Avoid extra aspirin, ibuprofen, vitamin E following the procedure - this may increase your chance of bruising.      Improper care of the treated area while the discoloration is present may increase the chance of scarring or skin textural changes to the treated area.    Please call the office if you have excessive discomfort, herpes simplex virus flare, or burns, blisters, or scabbing.     150

## 2024-07-23 NOTE — PROGRESS NOTES
Laser Procedure Note       Deshawn Magaña   YOB: 1970    DATE OF VISIT:  2024  Chief Complaint   Patient presents with    Procedure     LASER: Vbeam  DIAGNOSIS: nose - rosacea    Here for trx of nose - concerns are erythema, telangiectasias and sebaceous overgrowth.    He is concerned that he will end up with rhinophyma and would like to proceed with any trx to improve appearance or prevent this and erythema.    1 previous trx 1 month ago.  Did very well and notes improvement.  No complications.    Discussed and rec laser - Vbeam.    Discussed no other standard trx for rhinophyma prevention but does not have significant sebaceous overgrowth at this point.    He is an endodontist.              We discussed treatment options, including no treatment as well as the following:  - need for multiple treatments and risk of incomplete clearance, recurrence  - risk of erythema, edema, purpura, dyspigmentation and scarring    PATIENT IDENTIFIED PER PROTOCOL: yes  LOCATION(S): face  VERIFIED AND MARKED: yes  TECHNIQUES, RISKS, BENEFITS AND ALTERNATIVES EXPLAINED: yes  CONSENT SIGNED, WITNESSED AND DATED: yes        OPERATIVE REPORT    DIAGNOSIS, LOCATION, PROCEDURE RECONFIRMED: yes   APPROPRIATE EYE PROTECTION for PATIENT: yes  APPROPRIATE EYE PROTECTION for PROVIDER and OTHERS PRESENT: yes  ANESTHESIA/PRE-OP MEDICATIONS: none  LASER SETTINGS:  (1)  WAVELENGTH: 595  LENS: 3x10 FLUENCE: 12.5 - 13  PULSE DURATION: 10  COOLIN/20  (2)  WAVELENGTH: 595  LENS: 10  FLUENCE: 7.5  PULSE DURATION: 10  COOLIN/20    PROCEDURE NOTE:  Individual telangiectasias treated with 2-3 pulses with setting 1 with blanching and focal purpura on the R ala.  Then entire nose + medial/perinasal cheeks treated with setting 2.    POST-OPERATIVE CARE/DISPOSITION: ice packs  COMPLICATIONS: none  MEDICATIONS: none  WOUND CARE INSTRUCTIONS PROVIDED: yes    F/u 4-6 weeks      150 - nose    Also advised against tanning bed use.

## 2024-08-29 ENCOUNTER — PROCEDURE VISIT (OUTPATIENT)
Dept: DERMATOLOGY | Age: 54
End: 2024-08-29

## 2024-08-29 DIAGNOSIS — I78.1 TELANGIECTASIA: Primary | ICD-10-CM

## 2024-08-29 PROCEDURE — DM01300 VBEAM LASER EXTRA SMALL AREA: Performed by: DERMATOLOGY

## 2024-08-29 NOTE — PATIENT INSTRUCTIONS
Following the procedure, the treated areas may be red or appear bruised and will likely be swollen for a few hours or up to a few days.  The affected areas should be treated delicately following treatment.  Discomfort and swelling should be treated with the application of hourly cool compresses the evening of the procedure.  Avoid applying ice directly to the skin.  If your face was treated, you may want to sleep with your head elevated on an extra pillow to help minimize swelling.    Wear sunscreen daily and avoid strenuous activity following rosacea treatments to optimize results.    Avoid extra aspirin, ibuprofen, vitamin E following the procedure - this may increase your chance of bruising.      Improper care of the treated area while the discoloration is present may increase the chance of scarring or skin textural changes to the treated area.    Please call the office if you have excessive discomfort, herpes simplex virus flare, or burns, blisters, or scabbing.     100

## 2024-08-29 NOTE — PROGRESS NOTES
Laser Procedure Note       Deshawn Magaña   YOB: 1970    DATE OF VISIT:  2024  Chief Complaint   Patient presents with    Laser Treatment     Took couple weeks to heal     LASER: Vbeam  DIAGNOSIS: nose - rosacea    Here for trx of nose - concerns are erythema, telangiectasias and sebaceous overgrowth.    He is concerned that he will end up with rhinophyma and would like to proceed with any trx to improve appearance or prevent this and erythema.    2 previous trx - last was ~1 month ago.  Did very well and notes improvement.  No complications.  Vessels on the nose gone/nearly done - faint remaining on the R ala.    Discussed and rec laser - Vbeam.    Discussed no other standard trx for rhinophyma prevention but does not have significant sebaceous overgrowth at this point.    He is an endodontist.              We discussed treatment options, including no treatment as well as the following:  - need for multiple treatments and risk of incomplete clearance, recurrence  - risk of erythema, edema, purpura, dyspigmentation and scarring    PATIENT IDENTIFIED PER PROTOCOL: yes  LOCATION(S): face  VERIFIED AND MARKED: yes  TECHNIQUES, RISKS, BENEFITS AND ALTERNATIVES EXPLAINED: yes  CONSENT SIGNED, WITNESSED AND DATED: yes        OPERATIVE REPORT    DIAGNOSIS, LOCATION, PROCEDURE RECONFIRMED: yes   APPROPRIATE EYE PROTECTION for PATIENT: yes  APPROPRIATE EYE PROTECTION for PROVIDER and OTHERS PRESENT: yes  ANESTHESIA/PRE-OP MEDICATIONS: none  LASER SETTINGS:  (1)  WAVELENGTH: 595  LENS: 3x10 FLUENCE: 12.5 - 13  PULSE DURATION: 10  COOLIN/20  (2)  WAVELENGTH: 595  LENS: 10  FLUENCE: 7.5  PULSE DURATION: 10  COOLIN/20    PROCEDURE NOTE:  Individual telangiectasias treated with 2-3 pulses with setting 1 with blanching and focal purpura on the R ala.  Then entire nose + medial/perinasal cheeks treated with setting 2.    POST-OPERATIVE CARE/DISPOSITION: ice packs  COMPLICATIONS: none  MEDICATIONS:  none  WOUND CARE INSTRUCTIONS PROVIDED: yes    F/u 4-6 weeks      100 - nose (decreased from 150 - 3rd trx)    Also advised against tanning bed use.

## 2025-01-14 ENCOUNTER — OFFICE VISIT (OUTPATIENT)
Age: 55
End: 2025-01-14
Payer: COMMERCIAL

## 2025-01-14 DIAGNOSIS — L57.0 AK (ACTINIC KERATOSIS): Primary | ICD-10-CM

## 2025-01-14 DIAGNOSIS — D22.9 BENIGN NEVUS: ICD-10-CM

## 2025-01-14 DIAGNOSIS — L71.9 ROSACEA: ICD-10-CM

## 2025-01-14 PROCEDURE — 17000 DESTRUCT PREMALG LESION: CPT | Performed by: DERMATOLOGY

## 2025-01-14 PROCEDURE — 4004F PT TOBACCO SCREEN RCVD TLK: CPT | Performed by: DERMATOLOGY

## 2025-01-14 PROCEDURE — 99213 OFFICE O/P EST LOW 20 MIN: CPT | Performed by: DERMATOLOGY

## 2025-01-14 PROCEDURE — 17003 DESTRUCT PREMALG LES 2-14: CPT | Performed by: DERMATOLOGY

## 2025-01-14 PROCEDURE — G8419 CALC BMI OUT NRM PARAM NOF/U: HCPCS | Performed by: DERMATOLOGY

## 2025-01-14 PROCEDURE — 3017F COLORECTAL CA SCREEN DOC REV: CPT | Performed by: DERMATOLOGY

## 2025-01-14 PROCEDURE — G8427 DOCREV CUR MEDS BY ELIG CLIN: HCPCS | Performed by: DERMATOLOGY

## 2025-01-14 NOTE — PROGRESS NOTES
[facility]  Pastora Joyce M.D.  [phone]       Deshawn Magaña  1970    54 y.o. male     Date of Visit: 1/14/2025    Chief Complaint:   Chief Complaint   Patient presents with    Skin Lesion        I was asked to see this patient by Dr. Philip ref. provider found.    History of Present Illness:  1. TBSE    Multiple nevi. Patient has not noticed any new or changing pigmented lesions.   Stable in size, shape, color. Not itching, bleeding.     Rosacea-status post laser with Lissy Alvares, good improvement.  Mostly erythema and telangiectasias, minimal inflammatory component     Skin history:   No personal history of dysplastic nevus or skin cancer  + ak     Family history nonmelanoma skin cancer in his father. No family history of melanoma     Recurrent tinea pedis-econazole    Review of Systems:  Constitutional: Reports general sense of well-being       Past Medical History, Surgical History, Family History, Medications and Allergies reviewed.    Social History:   Social History     Socioeconomic History    Marital status:      Spouse name: Not on file    Number of children: Not on file    Years of education: Not on file    Highest education level: Not on file   Occupational History    Not on file   Tobacco Use    Smoking status: Never    Smokeless tobacco: Current    Tobacco comments:     can a week   Vaping Use    Vaping status: Never Used   Substance and Sexual Activity    Alcohol use: Yes    Drug use: Not on file    Sexual activity: Not on file   Other Topics Concern    Not on file   Social History Narrative    Not on file     Social Determinants of Health     Financial Resource Strain: Not on file   Food Insecurity: Not on file   Transportation Needs: Not on file   Physical Activity: Not on file   Stress: Not on file   Social Connections: Not on file   Intimate Partner Violence: Not on file   Housing Stability: Not on file       Physical Examination       -General: Well-appearing,

## 2025-02-25 RX ORDER — ECONAZOLE NITRATE 10 MG/G
CREAM TOPICAL
Qty: 85 G | Refills: 6 | Status: SHIPPED | OUTPATIENT
Start: 2025-02-25

## 2025-05-09 ENCOUNTER — OFFICE VISIT (OUTPATIENT)
Dept: CARDIOLOGY CLINIC | Age: 55
End: 2025-05-09
Payer: COMMERCIAL

## 2025-05-09 VITALS
SYSTOLIC BLOOD PRESSURE: 128 MMHG | DIASTOLIC BLOOD PRESSURE: 70 MMHG | OXYGEN SATURATION: 96 % | HEART RATE: 43 BPM | BODY MASS INDEX: 28.84 KG/M2 | WEIGHT: 206 LBS | HEIGHT: 71 IN

## 2025-05-09 DIAGNOSIS — I34.0 NONRHEUMATIC MITRAL VALVE REGURGITATION: Primary | ICD-10-CM

## 2025-05-09 DIAGNOSIS — Z91.89 AT RISK FOR HEART DISEASE: ICD-10-CM

## 2025-05-09 DIAGNOSIS — K21.9 GASTROESOPHAGEAL REFLUX DISEASE WITHOUT ESOPHAGITIS: ICD-10-CM

## 2025-05-09 PROCEDURE — 99214 OFFICE O/P EST MOD 30 MIN: CPT | Performed by: INTERNAL MEDICINE

## 2025-05-09 PROCEDURE — G8427 DOCREV CUR MEDS BY ELIG CLIN: HCPCS | Performed by: INTERNAL MEDICINE

## 2025-05-09 PROCEDURE — 4004F PT TOBACCO SCREEN RCVD TLK: CPT | Performed by: INTERNAL MEDICINE

## 2025-05-09 PROCEDURE — 3017F COLORECTAL CA SCREEN DOC REV: CPT | Performed by: INTERNAL MEDICINE

## 2025-05-09 PROCEDURE — G8419 CALC BMI OUT NRM PARAM NOF/U: HCPCS | Performed by: INTERNAL MEDICINE

## 2025-05-09 NOTE — PROGRESS NOTES
[facility]  Cardiac Consult     Referring Provider:  Royce Madrigal MD     Chief Complaint   Patient presents with    1 Year Follow Up     With echo        History of Present Illness:  54 y.o. male seen in consultation at the request of Dr. Madrigal for mitral regurgitation.    He is very active. He exercises, including jogging and weight lifting on a regular basis. He just returned from another [place] from a ski trip.  He was seen by Dr. Wheeler in 2005 with palpitations. Holter at that time was normal except for some PVC's. Stress ECHO normal with mild-moderate MR.    Due to the history of MR his ECHO was  repeated 2/2023. EF read as 50-55% and MR read as moderate. He has had yearly ECHO's since with MR on the mild side of moderate. Normal LV size, function and strain. No change on ECHO today.    He is doing very well.  He remains very active.  He continues to go snow skiing several times a year.  He has no shortness of breath and no chest pain.    Past Medical History:  Mitral regurgitation, GERD, hypothyroid    Surgical History:   has a past surgical history that includes Knee arthroscopy and Colonoscopy (N/A, 11/11/2022).     Social History:  Social History     Tobacco Use    Smoking status: Never     Passive exposure: Never    Smokeless tobacco: Current    Tobacco comments:     can a week   Substance Use Topics    Alcohol use: Yes        Family History:  family history includes Arrhythmia in his father; Cancer in his father.     Allergies:  Patient has no known allergies.     Home Medications:  Prior to Visit Medications    Medication Sig Taking? Authorizing Provider   econazole nitrate 1 % cream APPLY TOPICALLY TO THE AFFECTED AREA DAILY Yes Pastora Joyce MD   ARMOUR THYROID 120 MG tablet 1.5 tablets daily Yes ProviderSom MD   Loratadine-Pseudoephedrine (CLARITIN-D 24 HOUR PO) Take by mouth Yes ProviderSom MD   omeprazole (PRILOSEC) 10 MG delayed release capsule

## (undated) DEVICE — ELECTRODE,ECG,STRESS,FOAM,3PK: Brand: MEDLINE

## (undated) DEVICE — CANNULA NSL AD TBNG L7FT PVC STR NONFLARED PRNG O2 DEL W STD

## (undated) DEVICE — FORCEPS BX L240CM JAW DIA2.8MM L CAP W/ NDL MIC MESH TOOTH

## (undated) DEVICE — ENDOSCOPIC KIT 2 12 FT OP4 DE2 GWN SYR